# Patient Record
Sex: MALE | Race: WHITE | NOT HISPANIC OR LATINO | URBAN - METROPOLITAN AREA
[De-identification: names, ages, dates, MRNs, and addresses within clinical notes are randomized per-mention and may not be internally consistent; named-entity substitution may affect disease eponyms.]

---

## 2020-12-10 ENCOUNTER — HOSPITAL ENCOUNTER (EMERGENCY)
Facility: HOSPITAL | Age: 45
Discharge: HOME | End: 2020-12-10
Attending: EMERGENCY MEDICINE | Admitting: EMERGENCY MEDICINE

## 2020-12-10 ENCOUNTER — APPOINTMENT (OUTPATIENT)
Dept: GENERAL RADIOLOGY | Facility: HOSPITAL | Age: 45
End: 2020-12-10

## 2020-12-10 VITALS
HEIGHT: 72 IN | HEART RATE: 72 BPM | RESPIRATION RATE: 14 BRPM | OXYGEN SATURATION: 97 % | WEIGHT: 190 LBS | DIASTOLIC BLOOD PRESSURE: 82 MMHG | TEMPERATURE: 97.8 F | SYSTOLIC BLOOD PRESSURE: 112 MMHG | BODY MASS INDEX: 25.73 KG/M2

## 2020-12-10 DIAGNOSIS — S93.401A SPRAIN OF RIGHT ANKLE, UNSPECIFIED LIGAMENT, INITIAL ENCOUNTER: Primary | ICD-10-CM

## 2020-12-10 DIAGNOSIS — S93.601A SPRAIN OF RIGHT FOOT, INITIAL ENCOUNTER: ICD-10-CM

## 2020-12-10 PROCEDURE — 99283 EMERGENCY DEPT VISIT LOW MDM: CPT

## 2020-12-10 PROCEDURE — 73610 X-RAY EXAM OF ANKLE: CPT

## 2020-12-10 PROCEDURE — 73630 X-RAY EXAM OF FOOT: CPT

## 2020-12-10 RX ORDER — DICLOFENAC SODIUM 75 MG/1
75 TABLET, DELAYED RELEASE ORAL 2 TIMES DAILY
Qty: 20 TABLET | Refills: 0 | Status: SHIPPED | OUTPATIENT
Start: 2020-12-10

## 2020-12-10 RX ORDER — BUPRENORPHINE AND NALOXONE 8; 2 MG/1; MG/1
1 FILM, SOLUBLE BUCCAL; SUBLINGUAL DAILY
COMMUNITY

## 2020-12-11 NOTE — DISCHARGE INSTRUCTIONS
Rest, ice, elevate. Wear the boot until pain free or cleared by orthopaedics.  Increase weight-bearing gradually as tolerated.  Recheck with orthopaedist of your choice or the one listed in 1 week.  Return to the ER as needed.

## 2020-12-11 NOTE — ED PROVIDER NOTES
EMERGENCY DEPARTMENT ENCOUNTER    Room Number:  03/03  Date seen:  12/10/2020  Time seen: 20:20 EST  PCP: Provider, No Known  Historian: patient      HPI:  Chief Complaint: right foot ankle pain    A complete HPI/ROS/PMH/PSH/SH/FH are unobtainable due to: none    Context: Patrick Reeves is a 45 y.o. male who presents to the ED for evaluation of right foot and ankle pain and swelling that began yesterday acutely upon falling from a ladder.  He states the pain is constant severe and worsens with range of motion ambulation and feels little bit better with ice and elevation.  He states that he was approximately one-story up pain [place] when he slipped, landed in some bushes.  He thinks his foot got tangled up in the ladder.  He did not end up hitting the ground.  Denies any head injury neck or back pain, any other extremity pain or injury chest pain or abdominal pain.        PAST MEDICAL HISTORY  Active Ambulatory Problems     Diagnosis Date Noted   • No Active Ambulatory Problems     Resolved Ambulatory Problems     Diagnosis Date Noted   • No Resolved Ambulatory Problems     Past Medical History:   Diagnosis Date   • Drug abuse (CMS/HCC)          PAST SURGICAL HISTORY  History reviewed. No pertinent surgical history.      FAMILY HISTORY  History reviewed. No pertinent family history.      SOCIAL HISTORY  Social History     Socioeconomic History   • Marital status: Single     Spouse name: Not on file   • Number of children: Not on file   • Years of education: Not on file   • Highest education level: Not on file   Tobacco Use   • Smoking status: Never Smoker   Substance and Sexual Activity   • Alcohol use: Not Currently         ALLERGIES  Patient has no known allergies.        REVIEW OF SYSTEMS  Review of Systems     All systems reviewed and negative except for those discussed in HPI.       PHYSICAL EXAM  ED Triage Vitals   Temp Heart Rate Resp BP SpO2   12/10/20 1939 12/10/20 1939 12/10/20 1939 12/10/20 1942  12/10/20 1939   97.8 °F (36.6 °C) 80 18 125/77 97 %      Temp src Heart Rate Source Patient Position BP Location FiO2 (%)   12/10/20 1939 12/10/20 1939 -- -- --   Tympanic Monitor            GENERAL: not distressed  HENT: atraumatic  EYES: no scleral icterus  CV:  regular rate  RESPIRATORY: normal effort  ABDOMEN: Nondistended  MUSCULOSKELETAL: Significant swelling to the right ankle, primarily laterally.  There is a great deal of tenderness on the lateral ankle ligaments and lateral malleolus as well into the mid and lateral tarsometatarsal joint.  There is no fifth metatarsal tenderness no proximal tib-fib tenderness.  DP PT pulses are 2+ and cap refill is brisk, sensation intact distally to light touch.  NEURO: alert, moves all extremities, follows commands  SKIN: warm, dry    Vital signs and nursing notes reviewed.            RADIOLOGY  XR Foot 3+ View Right   Final Result   Soft tissue swelling and ankle. No fracture identified in   the foot or the ankle.       This report was finalized on 12/10/2020 8:46 PM by Dr. Alden Kruse M.D.          XR Ankle 3+ View Right   Final Result   Soft tissue swelling and ankle. No fracture identified in   the foot or the ankle.       This report was finalized on 12/10/2020 8:46 PM by Dr. Alden Kruse M.D.              I ordered the above noted radiological studies. Reviewed by me and discussed with radiologist.  See dictation for official radiology interpretation.    PROCEDURES  Procedures        MEDICATIONS GIVEN IN ER  Medications - No data to display          PROGRESS AND CONSULTS    DDX includes but not limited to ankle fracture, ankle contusion, ankle sprain, foot sprain, foot fracture    ED Course as of Dec 10 2219   Thu Dec 10, 2020   2136 My interpretation of the right foot and ankle x-rays is no acute fracture    [KA]   2219 I reassessed the patient, he is resting comfortably.  He will be discharged with a walking boot with instructions to rest ice and  elevate, follow-up with orthopedics.  I have prescribed him an NSAID.  He is stable for discharge.  He is agreeable with the plan.    [KA]      ED Course User Index  [KA] Nadia Feliciano PA        Reviewed pt's history and workup with Dr. Varela.  After a bedside evaluation; they agree with the plan of care      Patient was placed in face mask in first look. Patient was wearing facemask each time I entered the room and throughout our encounter. I wore protective equipment throughout this patient encounter including a face mask, eye shield and gloves. Hand hygiene was performed before donning protective equipment and after removal when leaving the room.        DIAGNOSIS  Final diagnoses:   Sprain of right ankle, unspecified ligament, initial encounter   Sprain of right foot, initial encounter               Latest Documented Vital Signs:  As of 22:19 EST  BP- 125/77 HR- 80 Temp- 97.8 °F (36.6 °C) (Tympanic) O2 sat- 97%       Nadia Feliciano PA  12/10/20 2533

## 2020-12-11 NOTE — ED PROVIDER NOTES
The YOUNG and I have discussed this patient's history, physical exam, and treatment plan. I have reviewed the documentation and personally had a face to face interaction with the patient  I affirm the documentation and agree with the treatment and plan.  The following describes my personal findings.    The patient presents complaining of right foot and ankle pain onset yesterday when the patient fell off a ladder getting his foot caught in the ladder landing in some bushes without head injury, no neck pain back pain, chest pain, shortness of air, abdominal pain.    Limited physical exam:  Patient is nontoxic appearing mild discomfort  Lungs/cardiovascular: Respiratory distress, chest wall nontender, breath sounds heard bilaterally  Abdomen: Soft, nontender  Back/extremities: No proximal fibula tenderness to palpation or deformity, right knee, tenderness to palpation, dorsalis pedis, posterior tibial pulses intact, no laxity of the ankle, Achilles tendon intact, sensation intact all toes      Patient was wearing facemask when I entered the room and throughout our encounter. Full protective equipment was worn throughout this patient encounter including a face mask, eye protection and gloves. Hand hygiene was performed before donning protective equipment and after removal when leaving the room.           Tomasa Varela MD  12/11/20 2718

## 2023-02-01 ENCOUNTER — APPOINTMENT (OUTPATIENT)
Dept: ULTRASOUND IMAGING | Facility: HOSPITAL | Age: 48
End: 2023-02-01
Payer: COMMERCIAL

## 2023-02-01 ENCOUNTER — HOSPITAL ENCOUNTER (EMERGENCY)
Facility: HOSPITAL | Age: 48
Discharge: HOME | End: 2023-02-01
Attending: EMERGENCY MEDICINE | Admitting: EMERGENCY MEDICINE
Payer: COMMERCIAL

## 2023-02-01 VITALS
SYSTOLIC BLOOD PRESSURE: 138 MMHG | HEIGHT: 72 IN | HEART RATE: 72 BPM | RESPIRATION RATE: 18 BRPM | DIASTOLIC BLOOD PRESSURE: 93 MMHG | OXYGEN SATURATION: 100 % | WEIGHT: 188 LBS | TEMPERATURE: 96.4 F | BODY MASS INDEX: 25.47 KG/M2

## 2023-02-01 DIAGNOSIS — N45.1 LEFT EPIDIDYMITIS: Primary | ICD-10-CM

## 2023-02-01 LAB
BILIRUB UR QL STRIP: NEGATIVE
CLARITY UR: CLEAR
COLOR UR: YELLOW
GLUCOSE UR STRIP-MCNC: NEGATIVE MG/DL
HGB UR QL STRIP.AUTO: NEGATIVE
KETONES UR QL STRIP: NEGATIVE
LEUKOCYTE ESTERASE UR QL STRIP.AUTO: NEGATIVE
NITRITE UR QL STRIP: NEGATIVE
PH UR STRIP.AUTO: 5.5 [PH] (ref 5–8)
PROT UR QL STRIP: NEGATIVE
SP GR UR STRIP: 1.02 (ref 1–1.03)
UROBILINOGEN UR QL STRIP: NORMAL

## 2023-02-01 PROCEDURE — 81003 URINALYSIS AUTO W/O SCOPE: CPT | Performed by: PHYSICIAN ASSISTANT

## 2023-02-01 PROCEDURE — 76870 US EXAM SCROTUM: CPT

## 2023-02-01 PROCEDURE — 99283 EMERGENCY DEPT VISIT LOW MDM: CPT

## 2023-02-01 PROCEDURE — 93976 VASCULAR STUDY: CPT

## 2023-02-01 RX ORDER — LEVOFLOXACIN 500 MG/1
500 TABLET, FILM COATED ORAL DAILY
Qty: 10 TABLET | Refills: 0 | Status: SHIPPED | OUTPATIENT
Start: 2023-02-01 | End: 2023-02-11

## 2023-02-01 RX ORDER — GABAPENTIN 300 MG/1
1 CAPSULE ORAL 3 TIMES DAILY
COMMUNITY
Start: 2023-01-10

## 2023-02-01 NOTE — ED NOTES
Pt c/o left sided testicular pain that started last night after intercourse. Denies swelling or redness, no discharge, no injury    This RN wore mask and goggles during time of contact

## 2023-02-01 NOTE — ED TRIAGE NOTES
Patient to er from home with c/o left testicle pain that started a few days ago and getting worse. Patient reported the left testicle is very sore to touch. Patient reported no injury to cause this. Patient has mask on in triage along with staff.

## 2023-02-02 NOTE — ED PROVIDER NOTES
"MD ATTESTATION NOTE    The YOUNG and I have discussed this patient's history, physical exam, and treatment plan.  I have reviewed the documentation and personally had a face to face interaction with the patient. I affirm the documentation and agree with the treatment and plan.  The attached note describes my personal findings.      I provided a substantive portion of the care of the patient.  I personally performed the physical exam in its entirety, and below are my findings.  For this patient encounter, the patient wore surgical mask, I wore full protective PPE including N95 and eye protection.      Brief HPI: 47-year-old gentleman who presents with left-sided testicular pain and dysuria for the last couple weeks.  He says that he was tested \"for STDs\" about 2 weeks ago and said that it was all negative but I cannot find these records when I looked through epic.  He does not feel like STDs are a concern for him at this point, but he does continue to have some burning with urination in the left testicle continues to hurt.  He said that he was on antibiotics about a month ago but he cannot remember what it was but said that he never got better    PHYSICAL EXAM  ED Triage Vitals   Temp Heart Rate Resp BP SpO2   02/01/23 1622 02/01/23 1622 02/01/23 1647 02/01/23 1646 02/01/23 1622   96.4 °F (35.8 °C) 79 20 153/82 97 %      Temp src Heart Rate Source Patient Position BP Location FiO2 (%)   -- -- 02/01/23 1920 02/01/23 1920 --     Sitting Left arm          GENERAL: no acute distress  HENT: nares patent  EYES: no scleral icterus  CV: regular rhythm, normal rate  RESPIRATORY: normal effort  ABDOMEN: soft,  exam normal circumcised male genitalia, no scrotal edema or erythema.  The left testicle hangs slightly lower than the right and is also slightly larger.  It is tender to palpation on the epididymal head.  Cremasteric reflex is brisk, nothing that looks like torsion and no other skin lesions.  MUSCULOSKELETAL: no " deformity  NEURO: alert, moves all extremities, follows commands  PSYCH:  calm, cooperative  SKIN: warm, dry    Vital signs and nursing notes reviewed.    MDM DISCUSSION        Plan: Ultrasound shows evidence of epididymitis.  The urinalysis still pending.  Were somewhat limited because were not able to find his records and do not know what antibiotics he was on, but I do think he needs a course of antibiotics which we will extend out to 10 days.  [facility] also [facility] appointment [facility] to be a good time for a follow-up     Rohit Cantu MD  02/01/23 1939

## 2023-02-02 NOTE — DISCHARGE INSTRUCTIONS
Wearing supportive underwear or jockstrap may help with pain and swelling  Take the medication until completed  Call urology tomorrow to schedule follow-up  Return to the ER as needed

## 2023-02-02 NOTE — ED PROVIDER NOTES
EMERGENCY DEPARTMENT ENCOUNTER    Room Number:  T03/03  Date seen:  2/1/2023  PCP: Carley Gomez PA  Discussed/ obtained information from independent historians: patient      HPI:  Chief Complaint: left testicle pain  A complete HPI/ROS/PMH/PSH/SH/FH are unobtainable due to: none  Context: Patrick Reeves is a 47 y.o. male who presents to the ED c/o left testicle pain since last night when started shortly after intercourse.  He states he has had some shorter lived and less intense pain in the area a couple times over the last couple months.  He denies any penile discharge lesions fevers abdominal pain nausea vomiting.  Within the last month he was noted to have some blood and bilirubin in his urine by PCP and was treated for a UTI with a course of antibiotics, does not know what he took.  Also recently had urine STD testing which was reportedly negative.  No trauma to the area, no other complaints at this time.      External (non-ED) record review: Urgent care visit 2/8/2021 for finger pain swelling and redness after cutting on a piece of wood few days prior.  He was diagnosed with tenosynovitis and referred to [facility] ER.      PAST MEDICAL HISTORY  Active Ambulatory Problems     Diagnosis Date Noted   • No Active Ambulatory Problems     Resolved Ambulatory Problems     Diagnosis Date Noted   • No Resolved Ambulatory Problems     Past Medical History:   Diagnosis Date   • Drug abuse (HCC)          PAST SURGICAL HISTORY  History reviewed. No pertinent surgical history.      FAMILY HISTORY  Family History   Problem Relation Age of Onset   • No Known Problems Mother    • No Known Problems Father          SOCIAL HISTORY  Social History     Socioeconomic History   • Marital status: Single   Tobacco Use   • Smoking status: Never   • Smokeless tobacco: Never   Vaping Use   • Vaping Use: Never used   Substance and Sexual Activity   • Alcohol use: Not Currently   • Drug use: Not Currently   • Sexual  activity: Defer         ALLERGIES  Patient has no known allergies.        REVIEW OF SYSTEMS  Review of Systems         PHYSICAL EXAM  ED Triage Vitals   Temp Heart Rate Resp BP SpO2   02/01/23 1622 02/01/23 1622 02/01/23 1647 02/01/23 1646 02/01/23 1622   96.4 °F (35.8 °C) 79 20 153/82 97 %      Temp src Heart Rate Source Patient Position BP Location FiO2 (%)   -- -- 02/01/23 1920 02/01/23 1920 --     Sitting Left arm        Physical Exam      GENERAL: no acute distress  HENT: normocephalic, atraumatic  EYES: no scleral icterus  CV: regular rhythm, normal rate  RESPIRATORY: normal effort   ABDOMEN: nondistended  MUSCULOSKELETAL: no deformity,  exam deferred to Dr. Cantu  NEURO: alert, moves all extremities, follows commands  PSYCH:  calm, cooperative  SKIN: warm, dry    Vital signs and nursing notes reviewed.          LAB RESULTS  Recent Results (from the past 24 hour(s))   Urinalysis With Microscopic If Indicated (No Culture) - Urine, Clean Catch    Collection Time: 02/01/23  7:18 PM    Specimen: Urine, Clean Catch   Result Value Ref Range    Color, UA Yellow Yellow, Straw    Appearance, UA Clear Clear    pH, UA 5.5 5.0 - 8.0    Specific Gravity, UA 1.019 1.005 - 1.030    Glucose, UA Negative Negative    Ketones, UA Negative Negative    Bilirubin, UA Negative Negative    Blood, UA Negative Negative    Protein, UA Negative Negative    Leuk Esterase, UA Negative Negative    Nitrite, UA Negative Negative    Urobilinogen, UA 0.2 E.U./dL 0.2 - 1.0 E.U./dL       Ordered the above labs and reviewed the results.        RADIOLOGY  US Scrotum & Testicles with doppler    Result Date: 2/1/2023  US SCROTUM AND TESTICLES-clinical: Left inferior testicular pain 47-year-old male  FINDINGS: The right testicle measures 5.4 x 3.5 x 3.0 cm. The left testicle measures 5.2 x 3.2 x 3.2 cm. Testicular echotexture is normal. Vascularity is symmetric. Arterial and venous waveforms interrogated from the substance of both testicles, no  torsion.  With the patient indicates discomfort corresponds to the tail of the left epididymis which is bulbous in contour with perhaps slightly greater vascularity in the remainder. Small bilateral hydroceles with a minimal amount of debris on the left. The right epididymis is satisfactory in appearance.  CONCLUSION: Patient's discomfort corresponds to the tail left epididymis which is bulbous in contour, slightly diminished in echotexture with perhaps subtle increased vascularity, Constellation of findings consistent with epididymitis. Small bilateral hydroceles, minimal debris on the left.  This report was finalized on 2/1/2023 5:51 PM by Dr. Juan Miller M.D.        Ordered the above noted radiological studies. Reviewed by me in PACS.            PROCEDURES  Procedures              MEDICATIONS GIVEN IN ER  Medications - No data to display                MEDICAL DECISION MAKING, PROGRESS, and CONSULTS    All labs have been independently reviewed by me.  All radiology studies have been reviewed by me and I have also reviewed the radiology report.   EKG's independently viewed and interpreted by me.  Discussion below represents my analysis of pertinent findings related to patient's condition, differential diagnosis, treatment plan and final disposition.      Additional sources:    Orders placed during this visit:  Orders Placed This Encounter   Procedures   • US Scrotum & Testicles with doppler   • Urinalysis With Microscopic If Indicated (No Culture) - Urine, Clean Catch   • Please collect urinalysis as soon as possible.  This is the only remaining test left to be able to disposition the patient  Misc Nursing Order (Specify)         Additional orders considered but not ordered:  STI testing considered however recently performed so not ordered tonight    Differential diagnosis:  Epididymitis, orchitis, STI, torsion      Independent interpretation of labs, radiology studies, and discussions with consultants:  ED  Course as of 02/01/23 1954 Wed Feb 01, 2023 1947 Color, UA: Yellow [KA]   1947 Blood, UA: Negative [KA]   1947 Bilirubin, UA: Negative [KA]   1948 Leukocytes, UA: Negative [KA]   1948 Nitrite, UA: Negative [KA]   1952 The patient's ultrasound suggestive of left sided epididymitis.  Urinalysis negative, recently tested for STIs which was reportedly negative.  We will place the patient on a 10-day course of Levaquin to follow-up with urology.  Counseled on the nature of the diagnosis and indications for return to the ER and he is stable for discharge. [KA]      ED Course User Index  [KA] Nadia Feliciano PA             Patient was wearing a face mask when I entered the room and they continued to wear a mask throughout their stay in the ED.  I wore PPE, including  gloves, face mask with shield or face mask with goggles whenever I was in the room with patient.     DIAGNOSIS  Final diagnoses:   Left epididymitis           Follow Up:  [facility]  [address] [address]  [phone]  Schedule an appointment as soon as possible for a visit         RX:     Medication List      New Prescriptions    levoFLOXacin 500 MG tablet  Commonly known as: LEVAQUIN  Take 1 tablet by mouth Daily for 10 days.           Where to Get Your Medications      These medications were sent to [facility]/pharmacy #[id number] - [place], [place] - [address] [facility] - [phone]  - [phone] [address] [address]    Phone: [phone]   · levoFLOXacin 500 MG tablet         Latest Documented Vital Signs:  As of 19:54 EST  BP- 138/93 HR- 79 Temp- 96.4 °F (35.8 °C) O2 sat- 98%              --    Please note that portions of this were completed with a voice recognition program.       Note Disclaimer: At [facility], we believe that sharing information builds trust and better relationships. You are receiving this note because you are receiving care at [facility] or recently visited. It is  possible you will see health information before a provider has talked with you about it. This kind of information can be easy to misunderstand. To help you fully understand what it means for your health, we urge you to discuss this note with your provider.           Nadia Feliciano PA  02/01/23 1954

## 2024-02-23 ENCOUNTER — APPOINTMENT (OUTPATIENT)
Dept: CT IMAGING | Facility: HOSPITAL | Age: 49
End: 2024-02-23

## 2024-02-23 ENCOUNTER — HOSPITAL ENCOUNTER (EMERGENCY)
Facility: HOSPITAL | Age: 49
Discharge: HOME | End: 2024-02-23
Attending: STUDENT IN AN ORGANIZED HEALTH CARE EDUCATION/TRAINING PROGRAM

## 2024-02-23 VITALS
SYSTOLIC BLOOD PRESSURE: 115 MMHG | WEIGHT: 190 LBS | OXYGEN SATURATION: 98 % | DIASTOLIC BLOOD PRESSURE: 80 MMHG | HEART RATE: 67 BPM | TEMPERATURE: 97.7 F | HEIGHT: 72 IN | BODY MASS INDEX: 25.73 KG/M2 | RESPIRATION RATE: 18 BRPM

## 2024-02-23 DIAGNOSIS — R10.10 PAIN OF UPPER ABDOMEN: Primary | ICD-10-CM

## 2024-02-23 DIAGNOSIS — N28.9 RENAL LESION: ICD-10-CM

## 2024-02-23 DIAGNOSIS — K76.0 HEPATIC STEATOSIS: ICD-10-CM

## 2024-02-23 DIAGNOSIS — N40.0 PROSTATE HYPERTROPHY: ICD-10-CM

## 2024-02-23 LAB
ALBUMIN SERPL-MCNC: 5 G/DL (ref 3.5–5.2)
ALBUMIN/GLOB SERPL: 2.1 G/DL
ALP SERPL-CCNC: 48 U/L (ref 39–117)
ALT SERPL W P-5'-P-CCNC: 22 U/L (ref 1–41)
ANION GAP SERPL CALCULATED.3IONS-SCNC: 15 MMOL/L (ref 5–15)
AST SERPL-CCNC: 22 U/L (ref 1–40)
BASOPHILS # BLD AUTO: 0.05 10*3/MM3 (ref 0–0.2)
BASOPHILS NFR BLD AUTO: 1.1 % (ref 0–1.5)
BILIRUB SERPL-MCNC: 4 MG/DL (ref 0–1.2)
BILIRUB UR QL STRIP: NEGATIVE
BUN SERPL-MCNC: 11 MG/DL (ref 6–20)
BUN/CREAT SERPL: 11 (ref 7–25)
CALCIUM SPEC-SCNC: 9.7 MG/DL (ref 8.6–10.5)
CHLORIDE SERPL-SCNC: 101 MMOL/L (ref 98–107)
CLARITY UR: CLEAR
CO2 SERPL-SCNC: 24 MMOL/L (ref 22–29)
COLOR UR: YELLOW
CREAT SERPL-MCNC: 1 MG/DL (ref 0.76–1.27)
DEPRECATED RDW RBC AUTO: 42.4 FL (ref 37–54)
EGFRCR SERPLBLD CKD-EPI 2021: 93.4 ML/MIN/1.73
EOSINOPHIL # BLD AUTO: 0.08 10*3/MM3 (ref 0–0.4)
EOSINOPHIL NFR BLD AUTO: 1.7 % (ref 0.3–6.2)
ERYTHROCYTE [DISTWIDTH] IN BLOOD BY AUTOMATED COUNT: 13.2 % (ref 12.3–15.4)
GLOBULIN UR ELPH-MCNC: 2.4 GM/DL
GLUCOSE SERPL-MCNC: 77 MG/DL (ref 65–99)
GLUCOSE UR STRIP-MCNC: NEGATIVE MG/DL
HCT VFR BLD AUTO: 44.1 % (ref 37.5–51)
HGB BLD-MCNC: 15.1 G/DL (ref 13–17.7)
HGB UR QL STRIP.AUTO: NEGATIVE
IMM GRANULOCYTES # BLD AUTO: 0.01 10*3/MM3 (ref 0–0.05)
IMM GRANULOCYTES NFR BLD AUTO: 0.2 % (ref 0–0.5)
KETONES UR QL STRIP: ABNORMAL
LEUKOCYTE ESTERASE UR QL STRIP.AUTO: NEGATIVE
LIPASE SERPL-CCNC: 32 U/L (ref 13–60)
LYMPHOCYTES # BLD AUTO: 1.34 10*3/MM3 (ref 0.7–3.1)
LYMPHOCYTES NFR BLD AUTO: 28.4 % (ref 19.6–45.3)
MCH RBC QN AUTO: 30.5 PG (ref 26.6–33)
MCHC RBC AUTO-ENTMCNC: 34.2 G/DL (ref 31.5–35.7)
MCV RBC AUTO: 89.1 FL (ref 79–97)
MONOCYTES # BLD AUTO: 0.39 10*3/MM3 (ref 0.1–0.9)
MONOCYTES NFR BLD AUTO: 8.3 % (ref 5–12)
NEUTROPHILS NFR BLD AUTO: 2.85 10*3/MM3 (ref 1.7–7)
NEUTROPHILS NFR BLD AUTO: 60.3 % (ref 42.7–76)
NITRITE UR QL STRIP: NEGATIVE
NRBC BLD AUTO-RTO: 0 /100 WBC (ref 0–0.2)
PH UR STRIP.AUTO: 5.5 [PH] (ref 5–8)
PLATELET # BLD AUTO: 153 10*3/MM3 (ref 140–450)
PMV BLD AUTO: 12.7 FL (ref 6–12)
POTASSIUM SERPL-SCNC: 4 MMOL/L (ref 3.5–5.2)
PROT SERPL-MCNC: 7.4 G/DL (ref 6–8.5)
PROT UR QL STRIP: NEGATIVE
RBC # BLD AUTO: 4.95 10*6/MM3 (ref 4.14–5.8)
SODIUM SERPL-SCNC: 140 MMOL/L (ref 136–145)
SP GR UR STRIP: 1.01 (ref 1–1.03)
UROBILINOGEN UR QL STRIP: ABNORMAL
WBC NRBC COR # BLD AUTO: 4.72 10*3/MM3 (ref 3.4–10.8)

## 2024-02-23 PROCEDURE — 99285 EMERGENCY DEPT VISIT HI MDM: CPT

## 2024-02-23 PROCEDURE — 85025 COMPLETE CBC W/AUTO DIFF WBC: CPT | Performed by: PHYSICIAN ASSISTANT

## 2024-02-23 PROCEDURE — 25510000001 IOPAMIDOL 61 % SOLUTION: Performed by: PHYSICIAN ASSISTANT

## 2024-02-23 PROCEDURE — 81003 URINALYSIS AUTO W/O SCOPE: CPT | Performed by: PHYSICIAN ASSISTANT

## 2024-02-23 PROCEDURE — 80053 COMPREHEN METABOLIC PANEL: CPT | Performed by: PHYSICIAN ASSISTANT

## 2024-02-23 PROCEDURE — 74177 CT ABD & PELVIS W/CONTRAST: CPT

## 2024-02-23 PROCEDURE — 83690 ASSAY OF LIPASE: CPT | Performed by: PHYSICIAN ASSISTANT

## 2024-02-23 RX ORDER — SODIUM CHLORIDE 0.9 % (FLUSH) 0.9 %
10 SYRINGE (ML) INJECTION AS NEEDED
Status: DISCONTINUED | OUTPATIENT
Start: 2024-02-23 | End: 2024-02-23 | Stop reason: HOSPADM

## 2024-02-23 RX ORDER — PANTOPRAZOLE SODIUM 40 MG/1
40 TABLET, DELAYED RELEASE ORAL DAILY
Qty: 15 TABLET | Refills: 0 | Status: SHIPPED | OUTPATIENT
Start: 2024-02-23

## 2024-02-23 RX ORDER — SUCRALFATE 1 G/1
1 TABLET ORAL 4 TIMES DAILY
Qty: 60 TABLET | Refills: 0 | Status: SHIPPED | OUTPATIENT
Start: 2024-02-23 | End: 2024-03-09

## 2024-02-23 RX ADMIN — IOPAMIDOL 85 ML: 612 INJECTION, SOLUTION INTRAVENOUS at 15:52

## 2024-02-23 NOTE — ED PROVIDER NOTES
EMERGENCY DEPARTMENT ENCOUNTER  Room Number:  02/02  PCP: Carley Gomez PA  Independent Historians: Patient      HPI:  Chief Complaint: had concerns including Abdominal Pain.     A complete HPI/ROS/PMH/PSH/SH/FH are unobtainable due to: None    Chronic or social conditions impacting patient care (Social Determinants of Health): None      Context: Patrick Reeves is a 48 y.o. male who presents to the ED c/o acute abdominal pain x 2 weeks.  Primarily epigastric but also generalized, worsened by eating.  Poor appetite last couple days because of it.  Has had some nausea without vomiting and also some intermittent loose stools, denies any black or bloody stools.  Denies fever chills chest pain shortness of breath hematuria dysuria urinary frequency and upper respiratory symptoms.  No prior abdominal surgeries.  He follows with gastroenterology due to history of elevated bilirubin and carries a diagnosis of Gilbert's disease.  He also reports he had a colonoscopy approximately 1 year ago that showed diverticulosis.  He denies any alcohol use and also denies any NSAID use.      Review of prior external notes (non-ED) -and- Review of prior external test results outside of this encounter:  Office visit with gastroenterology 7/17/2023.  Remote history of opiate use disorder referred to them for elevated LFTs .  Labs ordered and workup was ultimately suspicious for Gilbert's disease.        PAST MEDICAL HISTORY  Active Ambulatory Problems     Diagnosis Date Noted    No Active Ambulatory Problems     Resolved Ambulatory Problems     Diagnosis Date Noted    No Resolved Ambulatory Problems     Past Medical History:   Diagnosis Date    Drug abuse          PAST SURGICAL HISTORY  No past surgical history on file.      FAMILY HISTORY  Family History   Problem Relation Age of Onset    No Known Problems Mother     No Known Problems Father          SOCIAL HISTORY  Social History     Socioeconomic History    Marital status:  Single   Tobacco Use    Smoking status: Never    Smokeless tobacco: Never   Vaping Use    Vaping Use: Never used   Substance and Sexual Activity    Alcohol use: Not Currently    Drug use: Not Currently    Sexual activity: Defer         ALLERGIES  Patient has no known allergies.      REVIEW OF SYSTEMS  Review of Systems  Included in HPI  All systems reviewed and negative except for those discussed in HPI.      PHYSICAL EXAM    I have reviewed the triage vital signs and nursing notes.    ED Triage Vitals   Temp Heart Rate Resp BP SpO2   02/23/24 1402 02/23/24 1402 02/23/24 1402 02/23/24 1415 02/23/24 1402   97.7 °F (36.5 °C) (!) 122 18 115/80 98 %      Temp src Heart Rate Source Patient Position BP Location FiO2 (%)   02/23/24 1402 02/23/24 1402 -- -- --   Tympanic Monitor          Physical Exam  GENERAL: alert, no acute distress  SKIN: Warm, dry  HENT: Normocephalic, atraumatic  EYES: no scleral icterus  CV: regular rhythm, regular rate  RESPIRATORY: normal effort, lungs clear  ABDOMEN: nondistended, mild epigastric tenderness, normal bowel sounds no guarding or rigidity  MUSCULOSKELETAL: no deformity  NEURO: alert, moves all extremities, follows commands            LAB RESULTS  Labs Reviewed   COMPREHENSIVE METABOLIC PANEL - Abnormal; Notable for the following components:       Result Value    Total Bilirubin 4.0 (*)     All other components within normal limits    Narrative:     GFR Normal >60  Chronic Kidney Disease <60  Kidney Failure <15     URINALYSIS W/ MICROSCOPIC IF INDICATED (NO CULTURE) - Abnormal; Notable for the following components:    Ketones, UA 40 mg/dL (2+) (*)     All other components within normal limits    Narrative:     Urine microscopic not indicated.   CBC WITH AUTO DIFFERENTIAL - Abnormal; Notable for the following components:    MPV 12.7 (*)     All other components within normal limits   LIPASE - Normal   CBC AND DIFFERENTIAL    Narrative:     The following orders were created for panel  order CBC & Differential.  Procedure                               Abnormality         Status                     ---------                               -----------         ------                     CBC Auto Differential[[id number]]        Abnormal            Final result                 Please view results for these tests on the individual orders.             RADIOLOGY  CT Abdomen Pelvis With Contrast    Result Date: 2/23/2024  CT ABDOMEN AND PELVIS WITH IV CONTRAST  HISTORY: Right-sided abdominal pain. Nausea, vomiting, and diarrhea.  TECHNIQUE:  CT includes axial imaging from the lung bases to the trochanters with intravenous contrast and without use of oral contrast. Data reconstructed in coronal and sagittal planes. Radiation dose reduction techniques were utilized, including automated exposure control and exposure modulation based on body size.  COMPARISON: None.  FINDINGS: There is a calcified nodule in the left lower lobe. Hepatic steatosis. Gallbladder, spleen, adrenal glands, and pancreas appear within normal limits. Subcentimeter left renal low-density lesion in upper pole is most likely a cyst though difficult to characterize due to its small size. 2 mm left lower pole, 2-3 mm right lower pole renal nonobstructing stones. No ureteral stone or evidence for obstructive uropathy. Prostate gland enlargement with median lobe hypertrophy.  There is no bowel dilatation or evidence for bowel obstruction. There is no ascites or evidence for intra-abdominal abscess formation.  There is no evidence for appendicitis. Small amount of fat density extends into the left inguinal canal.      1. No evidence for acute intra-abdominal process. 2. Small lower pole nonobstructing renal stones. 3. Hepatic steatosis. 4. Prostate gland enlargement with median lobe hypertrophy.  Radiation dose reduction techniques were utilized, including automated exposure control and exposure modulation based on body size.    This report was  finalized on 2/23/2024 5:43 PM by Dr. Meño Greer M.D on Workstation: [id number]         MEDICATIONS GIVEN IN ER  Medications   iopamidol (ISOVUE-300) 61 % injection 100 mL (85 mL Intravenous Given by Other 2/23/24 1552)         ORDERS PLACED DURING THIS VISIT:  Orders Placed This Encounter   Procedures    CT Abdomen Pelvis With Contrast    Comprehensive Metabolic Panel    Lipase    Urinalysis With Microscopic If Indicated (No Culture) - Urine, Clean Catch    CBC Auto Differential    CBC & Differential         OUTPATIENT MEDICATION MANAGEMENT:  No current Epic-ordered facility-administered medications on file.     Current Outpatient Medications Ordered in Epic   Medication Sig Dispense Refill    minocycline (MINOCIN,DYNACIN) 100 MG capsule Take 1 capsule by mouth Daily.      pantoprazole (PROTONIX) 40 MG EC tablet Take 1 tablet by mouth Daily. 15 tablet 0    sucralfate (CARAFATE) 1 g tablet Take 1 tablet by mouth 4 (Four) Times a Day for 15 days. 60 tablet 0         PROCEDURES  Procedures            PROGRESS, DATA ANALYSIS, CONSULTS, AND MEDICAL DECISION MAKING  All labs have been independently interpreted by me.  All radiology studies have been reviewed by me. All EKG's have been independently viewed and interpreted by me.  Discussion below represents my analysis of pertinent findings related to patient's condition, differential diagnosis, treatment plan and final disposition.    DIFFERENTIAL    Clinical Scores:                  ED Course as of 02/24/24 1437   Fri Feb 23, 2024   1432 First look: Patient presents with a couple weeks of generalized abdominal pain as well as some nausea and diarrhea.  He denies any hematemesis or melena.  No fevers or chills.  Pain seems to be made worse with eating.  No prior surgical history to the abdomen.  Reports previous diagnosis of diverticulitis.  Denies urinary symptoms.  Patient has mild right upper quadrant and left lower quadrant discomfort on palpation without  guarding or rigidity.  He is well-appearing, nontoxic.  Will obtain labs and CT imaging for further workup. [DC]   1548 Glucose: 77 [KA]   1548 Creatinine: 1.00 [KA]   1548 Lipase: 32 [KA]   1548 Nitrite, UA: Negative [KA]   1548 Ketones, UA(!): 40 mg/dL (2+) [KA]   1548 WBC: 4.72 [KA]   1548 Hemoglobin: 15.1 [KA]   1728 Reassessed the patient.  I counseled him on all of his lab and imaging results including all incidental findings.  He states un[facility] due to history of elevated bilirubin and was subsequently diagnosed with Gilbert's disease.  I recommended that he follow-up with them.  Symptoms sound like they may be related to gastritis due to pain with eating especially in the epigastrium and upper abdominal area with no evidence of pancreatitis on lab testing or imaging.  I recommended a course of Protonix and Carafate and that he call his gastroenterologist when the office opens to schedule ASAP.  In the interim he should recheck in a couple days with his PCP and return to the ER for any new or worsening symptoms.  There is no evidence of any acute infectious or surgical problem at this time, abdomen is nonsurgical, soft and nondistended.  He is agreeable with this plan. [KA]      ED Course User Index  [DC] Sarah West PA  [KA] Nadia Feliciano PA-C             AS OF 14:37 EST VITALS:    BP - 115/80  HR - 67  TEMP - 97.7 °F (36.5 °C) (Tympanic)  O2 SATS - 98%    COMPLEXITY OF CARE  Admission was considered but after careful review of the patient's presentation, physical examination, diagnostic results, and response to treatment the patient may be safely discharged with outpatient follow-up.      DIAGNOSIS  Final diagnoses:   Pain of upper abdomen   Hepatic steatosis   Renal lesion   Prostate hypertrophy         DISPOSITION  ED Disposition       ED Disposition   Discharge    Condition   Good    Comment   --                  FOLLOW UP  Carley Gomez PA  [address] [facility]  [address]  [phone]    In 2 days      Maria Dolores Liang MD  [address] [place] [address] [address]  [facility] [address]  [phone]    Schedule an appointment as soon as possible for a visit       [facility] EMERGENCY DEPARTMENT  [address] [facility] [address] [phone]    If symptoms worsen or any concerns              Please note that portions of this document were completed with a voice recognition program.    Note Disclaimer: At [facility], we believe that sharing information builds trust and better relationships. You are receiving this note because you recently visited [facility]. It is possible you will see health information before a provider has talked with you about it. This kind of information can be easy to misunderstand. To help you fully understand what it means for your health, we urge you to discuss this note with your provider.

## 2024-02-23 NOTE — DISCHARGE INSTRUCTIONS
Follow-up with your PCP and gastroenterologist, call Monday to schedule    Carafate will decrease the absorption of doxycycline

## 2024-02-26 NOTE — ED PROVIDER NOTES
EMERGENCY DEPARTMENT MD ATTESTATION NOTE    SHARED VISIT: This visit was performed by BOTH a physician and an APC. The substantive portion of the medical decision making was performed by this attesting physician who made or approved the management plan and takes responsibility for patient management. All studies documented in the APC note (if performed) were independently interpreted by me.    The YOUNG and I have discussed this patient's history, physical exam, MDM, and treatment plan.  I have reviewed the documentation and personally had a face to face interaction with the patient. The attached note describes my personal findings.        Room Number:  02/02  PCP: Carley Gomez PA  Independent Historians: Patient    HPI:    Context: Patrick Reeves is a 48 y.o. male who presents to the ED c/o acute abdominal pain for approximately 2 weeks.  Pain is located in the epigastrium and worsened by eating.  Patient additionally notable for occasional loose stool.        Review of prior external notes (non-ED) -and- Review of prior external test results outside of this encounter: Office visit with gastroenterology from 7/17/2023 reviewed and notable for remote history of opiate use disorder and elevated LFTs.  Patient ultimately diagnosed with Gilbert's disease      PHYSICAL EXAM    I have reviewed the triage vital signs and nursing notes.    ED Triage Vitals   Temp Heart Rate Resp BP SpO2   02/23/24 1402 02/23/24 1402 02/23/24 1402 02/23/24 1415 02/23/24 1402   97.7 °F (36.5 °C) (!) 122 18 115/80 98 %      Temp src Heart Rate Source Patient Position BP Location FiO2 (%)   02/23/24 1402 02/23/24 1402 -- -- --   Tympanic Monitor          Physical Exam  GENERAL: alert, no acute distress  SKIN: Warm, dry  HENT: Normocephalic, atraumatic  EYES: no scleral icterus  CV: regular rhythm, regular rate  RESPIRATORY: normal effort, lungs clear  ABDOMEN: soft, mild tenderness in the epigastrium, nondistended  MUSCULOSKELETAL: no  deformity  NEURO: alert, moves all extremities, follows commands            MEDICATIONS GIVEN IN ER  Medications   iopamidol (ISOVUE-300) 61 % injection 100 mL (85 mL Intravenous Given by Other 2/23/24 1552)         ORDERS PLACED DURING THIS VISIT:  Orders Placed This Encounter   Procedures    CT Abdomen Pelvis With Contrast    Comprehensive Metabolic Panel    Lipase    Urinalysis With Microscopic If Indicated (No Culture) - Urine, Clean Catch    CBC Auto Differential    CBC & Differential       PROGRESS, DATA ANALYSIS, CONSULTS, AND MEDICAL DECISION MAKING  All labs have been independently interpreted by me.  All radiology studies have been reviewed by me. All EKG's have been independently viewed and interpreted by me.  Discussion below represents my analysis of pertinent findings related to patient's condition, differential diagnosis, treatment plan and final disposition.    Differential diagnosis includes but is not limited to PUD, biliary pathology, gastritis.    Clinical Scores:                   ED Course as of 02/26/24 1845   Fri Feb 23, 2024   1432 First look: Patient presents with a couple weeks of generalized abdominal pain as well as some nausea and diarrhea.  He denies any hematemesis or melena.  No fevers or chills.  Pain seems to be made worse with eating.  No prior surgical history to the abdomen.  Reports previous diagnosis of diverticulitis.  Denies urinary symptoms.  Patient has mild right upper quadrant and left lower quadrant discomfort on palpation without guarding or rigidity.  He is well-appearing, nontoxic.  Will obtain labs and CT imaging for further workup. [DC]   1548 Glucose: 77 [KA]   1548 Creatinine: 1.00 [KA]   1548 Lipase: 32 [KA]   1548 Nitrite, UA: Negative [KA]   1548 Ketones, UA(!): 40 mg/dL (2+) [KA]   1548 WBC: 4.72 [KA]   1548 Hemoglobin: 15.1 [KA]   1728 Reassessed the patient.  I counseled him on all of his lab and imaging results including all incidental findings.  He states  univalve GI due to history of elevated bilirubin and was subsequently diagnosed with Gilbert's disease.  I recommended that he follow-up with them.  Symptoms sound like they may be related to gastritis due to pain with eating especially in the epigastrium and upper abdominal area with no evidence of pancreatitis on lab testing or imaging.  I recommended a course of Protonix and Carafate and that he call his gastroenterologist when the office opens to schedule ASAP.  In the interim he should recheck in a couple days with his PCP and return to the ER for any new or worsening symptoms.  There is no evidence of any acute infectious or surgical problem at this time, abdomen is nonsurgical, soft and nondistended.  He is agreeable with this plan. [KA]      ED Course User Index  [DC] Sarah West PA  [KA] Nadia Feliciano PA-C       MDM: 48-year-old male presenting for evaluation of epigastric abdominal pain.  Laboratory evaluations overall unremarkable with exception of mild ketones in the urine.    Patient initially tachycardic upon arrival but improved after settling in.  Radiological evaluation is overall unremarkable.  Suspect patient's symptoms are secondary to PUD versus gastritis.  Will initiate course of pantoprazole and Carafate and counseled to follow closely with primary care.  Return precautions discussed.  Patient discharged in stable condition.      COMPLEXITY OF CARE  Admission was considered but after careful review of the patient's presentation, physical examination, diagnostic results, and response to treatment the patient may be safely discharged with outpatient follow-up.    Please note that portions of this document were completed with a voice recognition program.    Note Disclaimer: At [facility], we believe that sharing information builds trust and better relationships. You are receiving this note because you recently visited [facility]. It is possible you will see health information  before a provider has talked with you about it. This kind of information can be easy to misunderstand. To help you fully understand what it means for your health, we urge you to discuss this note with your provider.         Herson Veliz MD  02/26/24 3736

## 2024-10-25 ENCOUNTER — APPOINTMENT (OUTPATIENT)
Dept: ULTRASOUND IMAGING | Facility: HOSPITAL | Age: 49
End: 2024-10-25
Payer: COMMERCIAL

## 2024-10-25 ENCOUNTER — HOSPITAL ENCOUNTER (EMERGENCY)
Facility: HOSPITAL | Age: 49
Discharge: HOME | End: 2024-10-25
Attending: EMERGENCY MEDICINE
Payer: COMMERCIAL

## 2024-10-25 ENCOUNTER — APPOINTMENT (OUTPATIENT)
Dept: CT IMAGING | Facility: HOSPITAL | Age: 49
End: 2024-10-25
Payer: COMMERCIAL

## 2024-10-25 VITALS
HEART RATE: 78 BPM | WEIGHT: 195 LBS | BODY MASS INDEX: 26.41 KG/M2 | OXYGEN SATURATION: 100 % | DIASTOLIC BLOOD PRESSURE: 87 MMHG | SYSTOLIC BLOOD PRESSURE: 137 MMHG | RESPIRATION RATE: 20 BRPM | HEIGHT: 72 IN | TEMPERATURE: 97.5 F

## 2024-10-25 DIAGNOSIS — N40.0 ENLARGED PROSTATE WITHOUT URINARY OBSTRUCTION: Primary | ICD-10-CM

## 2024-10-25 LAB
ALBUMIN SERPL-MCNC: 4.6 G/DL (ref 3.5–5.2)
ALBUMIN/GLOB SERPL: 1.8 G/DL
ALP SERPL-CCNC: 45 U/L (ref 39–117)
ALT SERPL W P-5'-P-CCNC: 20 U/L (ref 1–41)
ANION GAP SERPL CALCULATED.3IONS-SCNC: 6.1 MMOL/L (ref 5–15)
AST SERPL-CCNC: 18 U/L (ref 1–40)
BASOPHILS # BLD AUTO: 0.06 10*3/MM3 (ref 0–0.2)
BASOPHILS NFR BLD AUTO: 1.2 % (ref 0–1.5)
BILIRUB SERPL-MCNC: 1.8 MG/DL (ref 0–1.2)
BILIRUB UR QL STRIP: NEGATIVE
BUN SERPL-MCNC: 11 MG/DL (ref 6–20)
BUN/CREAT SERPL: 11.6 (ref 7–25)
CALCIUM SPEC-SCNC: 9.3 MG/DL (ref 8.6–10.5)
CHLORIDE SERPL-SCNC: 105 MMOL/L (ref 98–107)
CLARITY UR: CLEAR
CO2 SERPL-SCNC: 27.9 MMOL/L (ref 22–29)
COLOR UR: YELLOW
CREAT SERPL-MCNC: 0.95 MG/DL (ref 0.76–1.27)
DEPRECATED RDW RBC AUTO: 49.1 FL (ref 37–54)
EGFRCR SERPLBLD CKD-EPI 2021: 98.7 ML/MIN/1.73
EOSINOPHIL # BLD AUTO: 0.11 10*3/MM3 (ref 0–0.4)
EOSINOPHIL NFR BLD AUTO: 2.2 % (ref 0.3–6.2)
ERYTHROCYTE [DISTWIDTH] IN BLOOD BY AUTOMATED COUNT: 14 % (ref 12.3–15.4)
GLOBULIN UR ELPH-MCNC: 2.5 GM/DL
GLUCOSE SERPL-MCNC: 98 MG/DL (ref 65–99)
GLUCOSE UR STRIP-MCNC: NEGATIVE MG/DL
HCT VFR BLD AUTO: 44.6 % (ref 37.5–51)
HGB BLD-MCNC: 14.9 G/DL (ref 13–17.7)
HGB UR QL STRIP.AUTO: NEGATIVE
IMM GRANULOCYTES # BLD AUTO: 0.02 10*3/MM3 (ref 0–0.05)
IMM GRANULOCYTES NFR BLD AUTO: 0.4 % (ref 0–0.5)
KETONES UR QL STRIP: NEGATIVE
LEUKOCYTE ESTERASE UR QL STRIP.AUTO: NEGATIVE
LIPASE SERPL-CCNC: 27 U/L (ref 13–60)
LYMPHOCYTES # BLD AUTO: 1.27 10*3/MM3 (ref 0.7–3.1)
LYMPHOCYTES NFR BLD AUTO: 25.2 % (ref 19.6–45.3)
MCH RBC QN AUTO: 31.8 PG (ref 26.6–33)
MCHC RBC AUTO-ENTMCNC: 33.4 G/DL (ref 31.5–35.7)
MCV RBC AUTO: 95.1 FL (ref 79–97)
MONOCYTES # BLD AUTO: 0.33 10*3/MM3 (ref 0.1–0.9)
MONOCYTES NFR BLD AUTO: 6.5 % (ref 5–12)
NEUTROPHILS NFR BLD AUTO: 3.25 10*3/MM3 (ref 1.7–7)
NEUTROPHILS NFR BLD AUTO: 64.5 % (ref 42.7–76)
NITRITE UR QL STRIP: NEGATIVE
PH UR STRIP.AUTO: 7.5 [PH] (ref 5–8)
PLATELET # BLD AUTO: 149 10*3/MM3 (ref 140–450)
PMV BLD AUTO: 12.5 FL (ref 6–12)
POTASSIUM SERPL-SCNC: 4.3 MMOL/L (ref 3.5–5.2)
PROT SERPL-MCNC: 7.1 G/DL (ref 6–8.5)
PROT UR QL STRIP: NEGATIVE
RBC # BLD AUTO: 4.69 10*6/MM3 (ref 4.14–5.8)
SODIUM SERPL-SCNC: 139 MMOL/L (ref 136–145)
SP GR UR STRIP: >1.03 (ref 1–1.03)
UROBILINOGEN UR QL STRIP: ABNORMAL
WBC NRBC COR # BLD AUTO: 5.04 10*3/MM3 (ref 3.4–10.8)

## 2024-10-25 PROCEDURE — 99285 EMERGENCY DEPT VISIT HI MDM: CPT

## 2024-10-25 PROCEDURE — 80053 COMPREHEN METABOLIC PANEL: CPT | Performed by: PHYSICIAN ASSISTANT

## 2024-10-25 PROCEDURE — 83690 ASSAY OF LIPASE: CPT | Performed by: PHYSICIAN ASSISTANT

## 2024-10-25 PROCEDURE — 87591 N.GONORRHOEAE DNA AMP PROB: CPT | Performed by: PHYSICIAN ASSISTANT

## 2024-10-25 PROCEDURE — 74177 CT ABD & PELVIS W/CONTRAST: CPT

## 2024-10-25 PROCEDURE — 76870 US EXAM SCROTUM: CPT

## 2024-10-25 PROCEDURE — 25510000001 IOPAMIDOL 61 % SOLUTION: Performed by: EMERGENCY MEDICINE

## 2024-10-25 PROCEDURE — 87491 CHLMYD TRACH DNA AMP PROBE: CPT | Performed by: PHYSICIAN ASSISTANT

## 2024-10-25 PROCEDURE — 85025 COMPLETE CBC W/AUTO DIFF WBC: CPT | Performed by: PHYSICIAN ASSISTANT

## 2024-10-25 PROCEDURE — 81003 URINALYSIS AUTO W/O SCOPE: CPT | Performed by: PHYSICIAN ASSISTANT

## 2024-10-25 RX ORDER — IOPAMIDOL 612 MG/ML
100 INJECTION, SOLUTION INTRAVASCULAR
Status: COMPLETED | OUTPATIENT
Start: 2024-10-25 | End: 2024-10-25

## 2024-10-25 RX ORDER — SODIUM CHLORIDE 0.9 % (FLUSH) 0.9 %
10 SYRINGE (ML) INJECTION AS NEEDED
Status: DISCONTINUED | OUTPATIENT
Start: 2024-10-25 | End: 2024-10-25 | Stop reason: HOSPADM

## 2024-10-25 RX ADMIN — IOPAMIDOL 85 ML: 612 INJECTION, SOLUTION INTRAVENOUS at 14:35

## 2024-10-25 NOTE — ED PROVIDER NOTES
MD ATTESTATION NOTE     SHARED VISIT: This visit was performed by BOTH a physician and an APC. The substantive portion of the medical decision making was performed by this attesting physician who made or approved the management plan and takes responsibility for patient management. All studies in the APC note (if performed) were independently interpreted by me.  The YOUNG and I have discussed this patient's history, physical exam, and treatment plan. I have reviewed the documentation and personally had a face to face interaction with the patient. I affirm the documentation and agree with the treatment and plan. I provided a substantive portion of the care of the patient.  I personally performed the physical exam in its entirety, and below are my findings.      Brief HPI: Patient complains of perineal pain for the past few days.  Denies fever, chills, abdominal pain, flank pain, dysuria, or hematuria.    PHYSICAL EXAM    GENERAL: Awake, alert, oriented x 3.  Well-developed, nourished male.  Resting comfortably in no acute distress  HENT: nares patent  EYES: no scleral icterus  CV: regular rhythm, normal rate  RESPIRATORY: normal effort, CTAB  ABDOMEN: soft, nondistended, nontender, no CVA tenderness  : Normal external genitalia.  No scrotal swelling.  No testicular tenderness.  No urethral discharge  MUSCULOSKELETAL: no deformity  NEURO: alert, moves all extremities, follows commands  PSYCH:  calm, cooperative  SKIN: warm, dry    Vital signs and nursing notes reviewed.        Plan: Obtain labs, testicular ultrasound, and CT abdomen/pelvis.    CT abdomen/pelvis personally interpreted by me.  My personal interpretation is: There are stones in both kidneys.  No obstructive uropathy.  No bowel obstruction.      MDM: Patient presented the ED complaining of perineal pain.  Labs were unremarkable.  He was afebrile.  He did not have acute abdomen.  CT scan showed some prostate enlargement but was otherwise unremarkable.   Scrotal ultrasound showed small bilateral hydroceles.  Patient will be discharged and referred to urology for follow-up      ED Course as of 10/25/24 1800   Fri Oct 25, 2024   1532 Patient reevaluated and reports he is having some left-sided testicular pain similar to previous.  Patient's labs and CT imaging are unremarkable.  We will obtain an ultrasound. [DC]      ED Course User Index  [DC] Sarah West PA Holland, William D, MD  10/25/24 3875

## 2024-10-25 NOTE — ED PROVIDER NOTES
EMERGENCY DEPARTMENT ENCOUNTER      PCP: Carley Gomez PA  Patient Care Team:  Carley Gomez PA as PCP - General (Physician Assistant)   Independent Historians: ***    HPI:  Chief Complaint: ***   A complete HPI/ROS/PMH/PSH/SH/FH are unobtainable due to: ***    Chronic or social conditions impacting patient care (social determinants of health): ***    Context: Patrick Reeves is a 49 y.o. male who presents to the ED c/o ***    Review of prior external notes and/or external test results outside of this encounter: ***      PAST MEDICAL HISTORY  Active Ambulatory Problems     Diagnosis Date Noted    No Active Ambulatory Problems     Resolved Ambulatory Problems     Diagnosis Date Noted    No Resolved Ambulatory Problems     Past Medical History:   Diagnosis Date    Drug abuse        The patient qualifies to receive the vaccine, but they have not yet received it.    PAST SURGICAL HISTORY  History reviewed. No pertinent surgical history.      FAMILY HISTORY  Family History   Problem Relation Age of Onset    No Known Problems Mother     No Known Problems Father          SOCIAL HISTORY  Social History     Socioeconomic History    Marital status: Single   Tobacco Use    Smoking status: Never     Passive exposure: Never    Smokeless tobacco: Never   Vaping Use    Vaping status: Never Used   Substance and Sexual Activity    Alcohol use: Not Currently    Drug use: Not Currently    Sexual activity: Defer         ALLERGIES  Patient has no known allergies.        REVIEW OF SYSTEMS  Review of Systems   ***  All systems reviewed and negative except for those discussed in HPI.       PHYSICAL EXAM    I have reviewed the triage vital signs and nursing notes.    ED Triage Vitals   Temp Heart Rate Resp BP SpO2   10/25/24 1340 10/25/24 1340 10/25/24 1340 10/25/24 1414 10/25/24 1340   97.5 °F (36.4 °C) 78 20 124/87 100 %      Temp src Heart Rate Source Patient Position BP Location FiO2 (%)   10/25/24 1340 -- -- -- --    Tympanic           Physical Exam  GENERAL: ***alert, no acute distress  SKIN: Warm, dry  HENT: Normocephalic, atraumatic  EYES: no scleral icterus  CV: regular rhythm, regular rate  RESPIRATORY: normal effort, lungs clear  ABDOMEN: soft, nontender, nondistended  MUSCULOSKELETAL: no deformity  NEURO: alert, moves all extremities, follows commands          LAB RESULTS  Recent Results (from the past 24 hours)   Comprehensive Metabolic Panel    Collection Time: 10/25/24  2:13 PM    Specimen: Blood   Result Value Ref Range    Glucose 98 65 - 99 mg/dL    BUN 11 6 - 20 mg/dL    Creatinine 0.95 0.76 - 1.27 mg/dL    Sodium 139 136 - 145 mmol/L    Potassium 4.3 3.5 - 5.2 mmol/L    Chloride 105 98 - 107 mmol/L    CO2 27.9 22.0 - 29.0 mmol/L    Calcium 9.3 8.6 - 10.5 mg/dL    Total Protein 7.1 6.0 - 8.5 g/dL    Albumin 4.6 3.5 - 5.2 g/dL    ALT (SGPT) 20 1 - 41 U/L    AST (SGOT) 18 1 - 40 U/L    Alkaline Phosphatase 45 39 - 117 U/L    Total Bilirubin 1.8 (H) 0.0 - 1.2 mg/dL    Globulin 2.5 gm/dL    A/G Ratio 1.8 g/dL    BUN/Creatinine Ratio 11.6 7.0 - 25.0    Anion Gap 6.1 5.0 - 15.0 mmol/L    eGFR 98.7 >60.0 mL/min/1.73   Lipase    Collection Time: 10/25/24  2:13 PM    Specimen: Blood   Result Value Ref Range    Lipase 27 13 - 60 U/L   CBC Auto Differential    Collection Time: 10/25/24  2:13 PM    Specimen: Blood   Result Value Ref Range    WBC 5.04 3.40 - 10.80 10*3/mm3    RBC 4.69 4.14 - 5.80 10*6/mm3    Hemoglobin 14.9 13.0 - 17.7 g/dL    Hematocrit 44.6 37.5 - 51.0 %    MCV 95.1 79.0 - 97.0 fL    MCH 31.8 26.6 - 33.0 pg    MCHC 33.4 31.5 - 35.7 g/dL    RDW 14.0 12.3 - 15.4 %    RDW-SD 49.1 37.0 - 54.0 fl    MPV 12.5 (H) 6.0 - 12.0 fL    Platelets 149 140 - 450 10*3/mm3    Neutrophil % 64.5 42.7 - 76.0 %    Lymphocyte % 25.2 19.6 - 45.3 %    Monocyte % 6.5 5.0 - 12.0 %    Eosinophil % 2.2 0.3 - 6.2 %    Basophil % 1.2 0.0 - 1.5 %    Immature Grans % 0.4 0.0 - 0.5 %    Neutrophils, Absolute 3.25 1.70 - 7.00 10*3/mm3     Lymphocytes, Absolute 1.27 0.70 - 3.10 10*3/mm3    Monocytes, Absolute 0.33 0.10 - 0.90 10*3/mm3    Eosinophils, Absolute 0.11 0.00 - 0.40 10*3/mm3    Basophils, Absolute 0.06 0.00 - 0.20 10*3/mm3    Immature Grans, Absolute 0.02 0.00 - 0.05 10*3/mm3   Urinalysis With Microscopic If Indicated (No Culture) - Urine, Clean Catch    Collection Time: 10/25/24  2:59 PM    Specimen: Urine, Clean Catch   Result Value Ref Range    Color, UA Yellow Yellow, Straw    Appearance, UA Clear Clear    pH, UA 7.5 5.0 - 8.0    Specific Gravity, UA >1.030 (H) 1.005 - 1.030    Glucose, UA Negative Negative    Ketones, UA Negative Negative    Bilirubin, UA Negative Negative    Blood, UA Negative Negative    Protein, UA Negative Negative    Leuk Esterase, UA Negative Negative    Nitrite, UA Negative Negative    Urobilinogen, UA 0.2 E.U./dL 0.2 - 1.0 E.U./dL       Ordered the above labs and independently reviewed and interpreted the results.        RADIOLOGY  CT Abdomen Pelvis With Contrast    Result Date: 10/25/2024  CT ABDOMEN PELVIS W CONTRAST-  INDICATION: Groin pain  COMPARISON: CT abdomen pelvis February 23, 2024  TECHNIQUE: Routine CT abdomen/pelvis with IV contrast. Coronal and sagittal reformats. Radiation dose reduction techniques were utilized, including automated exposure control and exposure modulation based on body size.  FINDINGS:  Lung bases: Respiratory motion artifact. Small amount of subsegmental atelectasis at the bases. Calcified pulmonary nodule in the lateral basilar left lower lobe, consistent with prior granulomatous infection.  ABDOMEN: Calcifications in the liver and spleen, consistent with prior granulomatous infection. Normal gallbladder. No biliary ductal dilatation. Spleen is normal in size. No pancreatic mass or pancreatic ductal dilatation seen. No adrenal nodules. Bilateral nonobstructing nephrolithiasis. For example, nonobstructing nephrolithiasis in the inferior pole right kidney, series 2, axial mage  54, measures 5 mm. For example, nonobstructing nephrolithiasis in the inferior pole left kidney, series 2, axial mage 61, measures 2 mm. Small cyst in the right mid kidney. No solid-appearing renal mass. No hydronephrosis.  Pelvis: Mild prostatomegaly with the prostate measuring 5.1 cm in right to left dimension, with small amount of mass effect on the bladder apex. Underdistended bladder. No bladder calculus.  Bowel: No obstruction. Normal appendix.  Abdominal wall: Small fat-containing umbilical hernia. Small fat-containing left direct inguinal hernia.  Retroperitoneum: No lymphadenopathy.  Vasculature: Patent. No abdominal aortic aneurysm.  Osseous structures: Bilateral femoral head osteophyte lipping. No destructive osseous lesions.       1. No acute findings identified in the abdomen or pelvis. 2. Bilateral nonobstructing nephrolithiasis. 3. Mild prostatomegaly  This report was finalized on 10/25/2024 2:59 PM by Dr. Alfredo Forbes M.D on Workstation: [id number]       I ordered the above noted radiological studies. Independently reviewed and interpreted by me.  See dictation for official radiology interpretation.      PROCEDURES    Procedures      MEDICATIONS GIVEN IN ER    Medications   sodium chloride 0.9 % flush 10 mL (has no administration in time range)   iopamidol (ISOVUE-300) 61 % injection 100 mL (85 mL Intravenous Given by Other 10/25/24 1435)         PROGRESS, DATA ANALYSIS, CONSULTS, AND MEDICAL DECISION MAKING    All labs have been independently reviewed and interpreted by me.  All radiology studies have been independently reviewed and interpreted by me and discussed with radiologist dictating the report.   EKG's independently reviewed and interpreted by me.  Discussion below represents my analysis of pertinent findings related to patient's condition, differential diagnosis, treatment plan and final disposition.    Differential diagnosis***    ED Course as of 10/25/24 1532   Fri Oct 25, 2024    1532 Patient reevaluated and reports he is having some left-sided testicular pain similar to previous.  Patient's labs and CT imaging are unremarkable.  We will obtain an ultrasound. [DC]      ED Course User Index  [DC] Sarah West PA             AS OF 15:32 EDT VITALS:    BP - 124/87  HR - 78  TEMP - 97.5 °F (36.4 °C) (Tympanic)  O2 SATS - 100%        DIAGNOSIS  Final diagnoses:   None         DISPOSITION  ED Disposition       None               Note Disclaimer: At [facility], we believe that sharing information builds trust and better relationships. You are receiving this note because you recently visited [facility]. It is possible you will see health information before a provider has talked with you about it. This kind of information can be easy to misunderstand. To help you fully understand what it means for your health, we urge you to discuss this note with your provider.

## 2024-10-28 LAB
C TRACH RRNA SPEC QL NAA+PROBE: NEGATIVE
N GONORRHOEA RRNA SPEC QL NAA+PROBE: NEGATIVE